# Patient Record
Sex: FEMALE | Race: BLACK OR AFRICAN AMERICAN | NOT HISPANIC OR LATINO | Employment: PART TIME | URBAN - METROPOLITAN AREA
[De-identification: names, ages, dates, MRNs, and addresses within clinical notes are randomized per-mention and may not be internally consistent; named-entity substitution may affect disease eponyms.]

---
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
## 2021-09-24 ENCOUNTER — HOSPITAL ENCOUNTER (EMERGENCY)
Facility: HOSPITAL | Age: 21
Discharge: HOME | End: 2021-09-24
Attending: EMERGENCY MEDICINE
Payer: MEDICAID

## 2021-09-24 VITALS
BODY MASS INDEX: 35.09 KG/M2 | SYSTOLIC BLOOD PRESSURE: 134 MMHG | WEIGHT: 190.69 LBS | OXYGEN SATURATION: 99 % | HEART RATE: 97 BPM | RESPIRATION RATE: 18 BRPM | HEIGHT: 62 IN | DIASTOLIC BLOOD PRESSURE: 88 MMHG | TEMPERATURE: 99 F

## 2021-09-24 DIAGNOSIS — N72 CERVICITIS: ICD-10-CM

## 2021-09-24 DIAGNOSIS — A59.9 TRICHOMONAS VAGINALIS INFECTION: Primary | ICD-10-CM

## 2021-09-24 LAB
B-HCG UR QL: NEGATIVE
BACTERIA #/AREA URNS HPF: ABNORMAL /HPF
BACTERIA GENITAL QL WET PREP: ABNORMAL
BILIRUB UR QL STRIP: ABNORMAL
CLARITY UR: ABNORMAL
CLUE CELLS VAG QL WET PREP: ABNORMAL
COLOR UR: YELLOW
CTP QC/QA: YES
FILAMENT FUNGI VAG WET PREP-#/AREA: ABNORMAL
GLUCOSE UR QL STRIP: NEGATIVE
HGB UR QL STRIP: ABNORMAL
HYALINE CASTS #/AREA URNS LPF: 0 /LPF
KETONES UR QL STRIP: NEGATIVE
LEUKOCYTE ESTERASE UR QL STRIP: ABNORMAL
MICROSCOPIC COMMENT: ABNORMAL
NITRITE UR QL STRIP: POSITIVE
PH UR STRIP: 6 [PH] (ref 5–8)
PROT UR QL STRIP: ABNORMAL
RBC #/AREA URNS HPF: 4 /HPF (ref 0–4)
SP GR UR STRIP: >=1.03 (ref 1–1.03)
SPECIMEN SOURCE: ABNORMAL
T VAGINALIS GENITAL QL WET PREP: ABNORMAL
URN SPEC COLLECT METH UR: ABNORMAL
UROBILINOGEN UR STRIP-ACNC: 1 EU/DL
WBC #/AREA URNS HPF: 40 /HPF (ref 0–5)
WBC #/AREA VAG WET PREP: ABNORMAL
YEAST GENITAL QL WET PREP: ABNORMAL

## 2021-09-24 PROCEDURE — 87086 URINE CULTURE/COLONY COUNT: CPT | Performed by: PHYSICIAN ASSISTANT

## 2021-09-24 PROCEDURE — 87210 SMEAR WET MOUNT SALINE/INK: CPT | Performed by: PHYSICIAN ASSISTANT

## 2021-09-24 PROCEDURE — 96372 THER/PROPH/DIAG INJ SC/IM: CPT

## 2021-09-24 PROCEDURE — 63600175 PHARM REV CODE 636 W HCPCS: Performed by: NURSE PRACTITIONER

## 2021-09-24 PROCEDURE — 87591 N.GONORRHOEAE DNA AMP PROB: CPT | Performed by: PHYSICIAN ASSISTANT

## 2021-09-24 PROCEDURE — 87491 CHLMYD TRACH DNA AMP PROBE: CPT | Performed by: PHYSICIAN ASSISTANT

## 2021-09-24 PROCEDURE — 99284 EMERGENCY DEPT VISIT MOD MDM: CPT | Mod: 25

## 2021-09-24 PROCEDURE — 81000 URINALYSIS NONAUTO W/SCOPE: CPT | Performed by: PHYSICIAN ASSISTANT

## 2021-09-24 PROCEDURE — 81025 URINE PREGNANCY TEST: CPT | Performed by: PHYSICIAN ASSISTANT

## 2021-09-24 PROCEDURE — 25000003 PHARM REV CODE 250: Performed by: NURSE PRACTITIONER

## 2021-09-24 RX ORDER — CEFTRIAXONE 1 G/1
0.5 INJECTION, POWDER, FOR SOLUTION INTRAMUSCULAR; INTRAVENOUS
Status: COMPLETED | OUTPATIENT
Start: 2021-09-24 | End: 2021-09-24

## 2021-09-24 RX ORDER — METRONIDAZOLE 500 MG/1
500 TABLET ORAL EVERY 12 HOURS
Qty: 14 TABLET | Refills: 0 | Status: SHIPPED | OUTPATIENT
Start: 2021-09-24 | End: 2021-10-01

## 2021-09-24 RX ORDER — DOXYCYCLINE HYCLATE 100 MG
100 TABLET ORAL
Status: COMPLETED | OUTPATIENT
Start: 2021-09-24 | End: 2021-09-24

## 2021-09-24 RX ORDER — DOXYCYCLINE 100 MG/1
100 CAPSULE ORAL 2 TIMES DAILY
Qty: 20 CAPSULE | Refills: 0 | Status: SHIPPED | OUTPATIENT
Start: 2021-09-24 | End: 2021-10-04

## 2021-09-24 RX ADMIN — CEFTRIAXONE SODIUM 0.5 G: 1 INJECTION, POWDER, FOR SOLUTION INTRAMUSCULAR; INTRAVENOUS at 02:09

## 2021-09-24 RX ADMIN — DOXYCYCLINE HYCLATE 100 MG: 100 TABLET, COATED ORAL at 02:09

## 2021-09-26 LAB — BACTERIA UR CULT: NO GROWTH

## 2021-09-27 LAB
C TRACH DNA SPEC QL NAA+PROBE: NOT DETECTED
N GONORRHOEA DNA SPEC QL NAA+PROBE: NOT DETECTED

## 2022-08-05 ENCOUNTER — HOSPITAL ENCOUNTER (EMERGENCY)
Facility: HOSPITAL | Age: 22
Discharge: HOME | End: 2022-08-05
Attending: FAMILY MEDICINE
Payer: MEDICAID

## 2022-08-05 VITALS
WEIGHT: 186.75 LBS | HEART RATE: 94 BPM | RESPIRATION RATE: 18 BRPM | HEIGHT: 62 IN | TEMPERATURE: 98 F | SYSTOLIC BLOOD PRESSURE: 148 MMHG | OXYGEN SATURATION: 100 % | BODY MASS INDEX: 34.37 KG/M2 | DIASTOLIC BLOOD PRESSURE: 94 MMHG

## 2022-08-05 DIAGNOSIS — Z20.2 POSSIBLE EXPOSURE TO STD: Primary | ICD-10-CM

## 2022-08-05 DIAGNOSIS — N76.0 ACUTE VAGINITIS: ICD-10-CM

## 2022-08-05 LAB
APPEARANCE UR: CLEAR
B-HCG UR QL: NEGATIVE
BACTERIA #/AREA URNS AUTO: ABNORMAL /HPF
BILIRUB UR QL STRIP.AUTO: NEGATIVE MG/DL
C TRACH DNA SPEC QL NAA+PROBE: NOT DETECTED
CLUE CELLS VAG QL WET PREP: ABNORMAL
COLOR UR AUTO: ABNORMAL
CTP QC/QA: YES
GLUCOSE UR QL STRIP.AUTO: NORMAL MG/DL
HYALINE CASTS #/AREA URNS LPF: ABNORMAL /LPF
KETONES UR QL STRIP.AUTO: NEGATIVE MG/DL
LEUKOCYTE ESTERASE UR QL STRIP.AUTO: NEGATIVE UNIT/L
MUCOUS THREADS URNS QL MICRO: ABNORMAL /LPF
N GONORRHOEA DNA SPEC QL NAA+PROBE: NOT DETECTED
NITRITE UR QL STRIP.AUTO: NEGATIVE
PH UR STRIP.AUTO: 6.5 [PH]
PROT UR QL STRIP.AUTO: NEGATIVE MG/DL
RBC #/AREA URNS AUTO: ABNORMAL /HPF
RBC UR QL AUTO: NEGATIVE UNIT/L
SP GR UR STRIP.AUTO: 1.02
SQUAMOUS #/AREA URNS LPF: ABNORMAL /HPF
T VAGINALIS VAG QL WET PREP: ABNORMAL
UROBILINOGEN UR STRIP-ACNC: NORMAL MG/DL
WBC #/AREA URNS AUTO: ABNORMAL /HPF
WBC #/AREA VAG WET PREP: ABNORMAL
YEAST SPEC QL WET PREP: ABNORMAL

## 2022-08-05 PROCEDURE — 87210 SMEAR WET MOUNT SALINE/INK: CPT | Performed by: PHYSICIAN ASSISTANT

## 2022-08-05 PROCEDURE — 87591 N.GONORRHOEAE DNA AMP PROB: CPT | Performed by: PHYSICIAN ASSISTANT

## 2022-08-05 PROCEDURE — 99283 EMERGENCY DEPT VISIT LOW MDM: CPT

## 2022-08-05 PROCEDURE — 81025 URINE PREGNANCY TEST: CPT | Performed by: PHYSICIAN ASSISTANT

## 2022-08-05 PROCEDURE — 81001 URINALYSIS AUTO W/SCOPE: CPT | Performed by: PHYSICIAN ASSISTANT

## 2022-08-05 PROCEDURE — 87491 CHLMYD TRACH DNA AMP PROBE: CPT | Performed by: PHYSICIAN ASSISTANT

## 2022-08-05 PROCEDURE — 63700000 PHARM REV CODE 250 ALT 637 W/O HCPCS: Performed by: PHYSICIAN ASSISTANT

## 2022-08-05 RX ORDER — FLUCONAZOLE 100 MG/1
200 TABLET ORAL
Status: COMPLETED | OUTPATIENT
Start: 2022-08-05 | End: 2022-08-05

## 2022-08-05 RX ADMIN — FLUCONAZOLE 200 MG: 100 TABLET ORAL at 06:08

## 2022-08-05 NOTE — ED PROVIDER NOTES
Encounter Date: 8/5/2022       History     Chief Complaint   Patient presents with    Exposure to STD    Dysuria     C/o vaginal discharge and dysuria after being exposed to possible std     Patient presents to ED with concerns for STD exposure after she began having dysuria, white vaginal discharge and discomfort in vagina after having sexual intercourse 5 days ago.  She denies fever, chills, pelvic pain, back pain, nausea, vomiting, vaginal lesions.     The history is provided by the patient. No  was used.   Vaginal Discharge  This is a new problem. Pertinent negatives include no chest pain, no abdominal pain, no headaches and no shortness of breath. She has tried nothing for the symptoms.     Review of patient's allergies indicates:  No Known Allergies  Past Medical History:   Diagnosis Date    Depression     Insomnia      History reviewed. No pertinent surgical history.  History reviewed. No pertinent family history.  Social History     Tobacco Use    Smoking status: Never Smoker    Smokeless tobacco: Never Used   Substance Use Topics    Alcohol use: Never    Drug use: Never     Review of Systems   Constitutional: Negative for chills and fever.   Eyes: Negative.    Respiratory: Negative for cough and shortness of breath.    Cardiovascular: Negative for chest pain and palpitations.   Gastrointestinal: Negative for abdominal pain, nausea and vomiting.   Genitourinary: Positive for dysuria and vaginal discharge (clear/ white). Negative for genital sores, hematuria and vaginal bleeding.   Musculoskeletal: Negative.    Skin: Negative.    Neurological: Negative for dizziness, light-headedness and headaches.   Hematological: Negative.        Physical Exam     Initial Vitals [08/05/22 1712]   BP Pulse Resp Temp SpO2   (!) 153/110 104 20 97.5 °F (36.4 °C) 100 %      MAP       --         Physical Exam    Nursing note and vitals reviewed.  Constitutional: She appears well-developed and  well-nourished.   HENT:   Nose: Nose normal.   Mouth/Throat: Oropharynx is clear and moist.   Eyes: Conjunctivae are normal.   Neck: Neck supple.   Normal range of motion.  Cardiovascular: Normal rate, normal heart sounds and intact distal pulses.   Pulmonary/Chest: Breath sounds normal.   Abdominal: Abdomen is soft. Bowel sounds are normal.   Genitourinary:    Pelvic exam was performed with patient supine.   There is no tenderness on the right labia. There is no tenderness on the left labia.    Vaginal discharge (clear) present.     Musculoskeletal:      Cervical back: Normal range of motion and neck supple.     Neurological: She is alert.   Skin: Skin is warm.         ED Course   Procedures  Labs Reviewed   WET PREP, GENITAL - Abnormal; Notable for the following components:       Result Value    WBC, Wet Prep Few (*)     All other components within normal limits   URINALYSIS, REFLEX TO URINE CULTURE - Abnormal; Notable for the following components:    Color, UA Light-Yellow (*)     Squamous Epithelial Cells, UA Occ (*)     Mucous, UA Trace (*)     RBC, UA 6-10 (*)     All other components within normal limits   CHLAMYDIA/GONORRHOEAE(GC), PCR - Normal   POCT URINE PREGNANCY          Imaging Results    None          Medications   fluconazole tablet 200 mg (200 mg Oral Given 8/5/22 1807)     Medical Decision Making:   Clinical Tests:   Lab Tests: Ordered and Reviewed  ED Management:  The patient is resting comfortably and in no acute distress.  I personally discussed her test results and treatment plan. She's likely experiencing vaginitis/ irration with mild yeast infection post sexual intercourse.    Gave strict ED precautions.  Specific conditions for return to the emergency department and importance of follow up with her primary care provider and GYN.  Patient voices understanding and agrees to the plan discussed. All patients' questions have been answered at this time.   She has remained hemodynamically stable  throughout entire stay in ED and is stable for discharge home.             ED Course as of 08/08/22 0122   Fri Aug 05, 2022   1740 Preg Test, Ur: Negative [ER]   Mon Aug 08, 2022   0122 Chlamydia trachomatis PCR: Not Detected [ER]   0122 N. gonorrhea PCR: Not Detected [ER]      ED Course User Index  [ER] SCAR Fields             Clinical Impression:   Final diagnoses:  [Z20.2] Possible exposure to STD (Primary)  [N76.0] Acute vaginitis          ED Disposition Condition    Discharge Stable        ED Prescriptions     None        Follow-up Information     Follow up With Specialties Details Why Contact Info    [facility] Emergency Medicine  As needed, If symptoms worsen [address] [facility] [address] [phone]           SCAR Fields  08/08/22 0122

## 2022-08-08 ENCOUNTER — HOSPITAL ENCOUNTER (EMERGENCY)
Facility: HOSPITAL | Age: 22
Discharge: HOME | End: 2022-08-08
Attending: INTERNAL MEDICINE
Payer: MEDICAID

## 2022-08-08 VITALS
HEIGHT: 62 IN | DIASTOLIC BLOOD PRESSURE: 78 MMHG | TEMPERATURE: 98 F | OXYGEN SATURATION: 99 % | WEIGHT: 180 LBS | HEART RATE: 61 BPM | BODY MASS INDEX: 33.13 KG/M2 | RESPIRATION RATE: 18 BRPM | SYSTOLIC BLOOD PRESSURE: 123 MMHG

## 2022-08-08 DIAGNOSIS — I49.9 CARDIAC ARRHYTHMIA, UNSPECIFIED CARDIAC ARRHYTHMIA TYPE: Primary | ICD-10-CM

## 2022-08-08 DIAGNOSIS — R07.9 CHEST PAIN: ICD-10-CM

## 2022-08-08 LAB
ALBUMIN SERPL-MCNC: 4.4 GM/DL (ref 3.5–5)
ALBUMIN/GLOB SERPL: 1.2 RATIO (ref 1.1–2)
ALP SERPL-CCNC: 48 UNIT/L (ref 40–150)
ALT SERPL-CCNC: 25 UNIT/L (ref 0–55)
AST SERPL-CCNC: 22 UNIT/L (ref 5–34)
B-HCG UR QL: NEGATIVE
BASOPHILS # BLD AUTO: 0.03 X10(3)/MCL (ref 0–0.2)
BASOPHILS NFR BLD AUTO: 0.3 %
BILIRUBIN DIRECT+TOT PNL SERPL-MCNC: 0.4 MG/DL
BUN SERPL-MCNC: 9.2 MG/DL (ref 7–18.7)
CALCIUM SERPL-MCNC: 10 MG/DL (ref 8.4–10.2)
CHLORIDE SERPL-SCNC: 105 MMOL/L (ref 98–107)
CK MB SERPL-MCNC: 0.7 NG/ML
CK SERPL-CCNC: 87 U/L (ref 29–168)
CO2 SERPL-SCNC: 24 MMOL/L (ref 22–29)
CREAT SERPL-MCNC: 0.69 MG/DL (ref 0.55–1.02)
CTP QC/QA: YES
D DIMER PPP IA.FEU-MCNC: <0.27 UG/ML FEU (ref 0–0.5)
EOSINOPHIL # BLD AUTO: 0.08 X10(3)/MCL (ref 0–0.9)
EOSINOPHIL NFR BLD AUTO: 0.8 %
ERYTHROCYTE [DISTWIDTH] IN BLOOD BY AUTOMATED COUNT: 13.3 % (ref 11.5–17)
GFR SERPLBLD CREATININE-BSD FMLA CKD-EPI: >60 MLS/MIN/1.73/M2
GLOBULIN SER-MCNC: 3.7 GM/DL (ref 2.4–3.5)
GLUCOSE SERPL-MCNC: 101 MG/DL (ref 74–100)
HCT VFR BLD AUTO: 41 % (ref 37–47)
HGB BLD-MCNC: 14 GM/DL (ref 12–16)
IMM GRANULOCYTES # BLD AUTO: 0.02 X10(3)/MCL (ref 0–0.04)
IMM GRANULOCYTES NFR BLD AUTO: 0.2 %
LYMPHOCYTES # BLD AUTO: 3.43 X10(3)/MCL (ref 0.6–4.6)
LYMPHOCYTES NFR BLD AUTO: 36 %
MAGNESIUM SERPL-MCNC: 2 MG/DL (ref 1.6–2.6)
MCH RBC QN AUTO: 30.4 PG (ref 27–31)
MCHC RBC AUTO-ENTMCNC: 34.1 MG/DL (ref 33–36)
MCV RBC AUTO: 88.9 FL (ref 80–94)
MONOCYTES # BLD AUTO: 0.7 X10(3)/MCL (ref 0.1–1.3)
MONOCYTES NFR BLD AUTO: 7.3 %
NEUTROPHILS # BLD AUTO: 5.3 X10(3)/MCL (ref 2.1–9.2)
NEUTROPHILS NFR BLD AUTO: 55.4 %
NRBC BLD AUTO-RTO: 0 %
PLATELET # BLD AUTO: 232 X10(3)/MCL (ref 130–400)
PMV BLD AUTO: 11.2 FL (ref 7.4–10.4)
POTASSIUM SERPL-SCNC: 3.8 MMOL/L (ref 3.5–5.1)
PROT SERPL-MCNC: 8.1 GM/DL (ref 6.4–8.3)
RBC # BLD AUTO: 4.61 X10(6)/MCL (ref 4.2–5.4)
SODIUM SERPL-SCNC: 141 MMOL/L (ref 136–145)
T4 FREE SERPL-MCNC: 0.98 NG/DL (ref 0.7–1.48)
TROPONIN I SERPL-MCNC: <0.01 NG/ML (ref 0–0.04)
TSH SERPL-ACNC: 0.92 UIU/ML (ref 0.35–4.94)
WBC # SPEC AUTO: 9.5 X10(3)/MCL (ref 4.5–11.5)

## 2022-08-08 PROCEDURE — 99285 EMERGENCY DEPT VISIT HI MDM: CPT | Mod: 25

## 2022-08-08 PROCEDURE — 25000003 PHARM REV CODE 250: Performed by: PHYSICIAN ASSISTANT

## 2022-08-08 PROCEDURE — 84484 ASSAY OF TROPONIN QUANT: CPT | Performed by: PHYSICIAN ASSISTANT

## 2022-08-08 PROCEDURE — 93005 ELECTROCARDIOGRAM TRACING: CPT

## 2022-08-08 PROCEDURE — 84439 ASSAY OF FREE THYROXINE: CPT | Performed by: PHYSICIAN ASSISTANT

## 2022-08-08 PROCEDURE — 80053 COMPREHEN METABOLIC PANEL: CPT | Performed by: PHYSICIAN ASSISTANT

## 2022-08-08 PROCEDURE — 84443 ASSAY THYROID STIM HORMONE: CPT | Performed by: PHYSICIAN ASSISTANT

## 2022-08-08 PROCEDURE — 82550 ASSAY OF CK (CPK): CPT | Performed by: PHYSICIAN ASSISTANT

## 2022-08-08 PROCEDURE — 82553 CREATINE MB FRACTION: CPT | Performed by: PHYSICIAN ASSISTANT

## 2022-08-08 PROCEDURE — 81025 URINE PREGNANCY TEST: CPT | Performed by: PHYSICIAN ASSISTANT

## 2022-08-08 PROCEDURE — 85379 FIBRIN DEGRADATION QUANT: CPT | Performed by: PHYSICIAN ASSISTANT

## 2022-08-08 PROCEDURE — 83735 ASSAY OF MAGNESIUM: CPT | Performed by: PHYSICIAN ASSISTANT

## 2022-08-08 PROCEDURE — 85025 COMPLETE CBC W/AUTO DIFF WBC: CPT | Performed by: PHYSICIAN ASSISTANT

## 2022-08-08 PROCEDURE — 36415 COLL VENOUS BLD VENIPUNCTURE: CPT | Performed by: PHYSICIAN ASSISTANT

## 2022-08-08 RX ORDER — HYDROXYZINE PAMOATE 25 MG/1
25 CAPSULE ORAL
Status: COMPLETED | OUTPATIENT
Start: 2022-08-08 | End: 2022-08-08

## 2022-08-08 RX ADMIN — HYDROXYZINE PAMOATE 25 MG: 25 CAPSULE ORAL at 08:08

## 2022-08-08 NOTE — Clinical Note
"Shirin Rodrigueze" Chong was seen and treated in our emergency department on 8/8/2022.  She may return to work on 08/10/2022.       If you have any questions or concerns, please don't hesitate to call.      SCAR Fields"

## 2022-08-09 NOTE — ED PROVIDER NOTES
Encounter Date: 8/8/2022       History     Chief Complaint   Patient presents with    Chest Pain     Pt brought in via [facility] for chest discomfort that started earlier at work. VSS.     Patient is a 22 year old female with a hx of depression and insomnia who presents to the ED with chest discomfort that started while at work earlier today.   She states this pain has been intermittent since then.  She does admit to drinking tea 2x daily.  She denies SOB, abdominal pain, nausea, vomiting, dizziness, vision changes.    The history is provided by the patient. No  was used.     Review of patient's allergies indicates:  No Known Allergies  Past Medical History:   Diagnosis Date    Depression     Insomnia      History reviewed. No pertinent surgical history.  History reviewed. No pertinent family history.  Social History     Tobacco Use    Smoking status: Never Smoker    Smokeless tobacco: Never Used   Substance Use Topics    Alcohol use: Never    Drug use: Never     Review of Systems   Constitutional: Negative for chills and fever.   Eyes: Negative.    Respiratory: Positive for chest tightness. Negative for cough and shortness of breath.    Cardiovascular: Positive for chest pain and palpitations.   Gastrointestinal: Negative for abdominal pain, nausea and vomiting.   Genitourinary: Negative.    Musculoskeletal: Negative.    Skin: Negative.    Neurological: Negative for dizziness, light-headedness and headaches.   Hematological: Negative.        Physical Exam     Initial Vitals [08/08/22 1836]   BP Pulse Resp Temp SpO2   120/72 82 18 98.4 °F (36.9 °C) 99 %      MAP       --         Physical Exam    Nursing note and vitals reviewed.  Constitutional: She appears well-developed and well-nourished.   HENT:   Nose: Nose normal.   Mouth/Throat: Oropharynx is clear and moist.   Eyes: Conjunctivae and EOM are normal. Pupils are equal, round, and reactive to light.   Cardiovascular: Normal heart sounds and  intact distal pulses.   HR fluctuating from 80s to 140   Pulmonary/Chest: Breath sounds normal. She has no wheezes.   Abdominal: Abdomen is soft. Bowel sounds are normal.   Musculoskeletal:         General: Normal range of motion.     Neurological: She is alert and oriented to person, place, and time. She has normal strength. GCS score is 15. GCS eye subscore is 4. GCS verbal subscore is 5. GCS motor subscore is 6.   Skin: Skin is warm. Capillary refill takes less than 2 seconds.         ED Course   Procedures  Labs Reviewed   COMPREHENSIVE METABOLIC PANEL - Abnormal; Notable for the following components:       Result Value    Glucose Level 101 (*)     Globulin 3.7 (*)     All other components within normal limits   CBC WITH DIFFERENTIAL - Abnormal; Notable for the following components:    MPV 11.2 (*)     All other components within normal limits   TROPONIN I - Normal   D DIMER, QUANTITATIVE - Normal   TSH - Normal   T4, FREE - Normal   CK-MB - Normal   CK - Normal   MAGNESIUM - Normal   CBC W/ AUTO DIFFERENTIAL    Narrative:     The following orders were created for panel order CBC Auto Differential.  Procedure                               Abnormality         Status                     ---------                               -----------         ------                     CBC with Differential[[id number]]        Abnormal            Final result                 Please view results for these tests on the individual orders.   EXTRA TUBES    Narrative:     The following orders were created for panel order EXTRA TUBES.  Procedure                               Abnormality         Status                     ---------                               -----------         ------                     Light Blue Top Hold[[id number]]                              In process                   Please view results for these tests on the individual orders.   LIGHT BLUE TOP HOLD   POCT URINE PREGNANCY     EKG Readings: (Independently  Interpreted)   Heart Rate: 89.   undetermined rhythm    Other EKG Interpretations: 20:51  Unusual P axis short AR, probable junctional tachycardia with  PACs and abberant conduction, rate 104     ECG Results          EKG 12-lead (Final result)  Result time 08/09/22 17:21:37    Final result by Interface, Lab In [facility] (08/09/22 17:21:37)                 Narrative:    Test Reason : R07.9,    Vent. Rate : 078 BPM     Atrial Rate : 078 BPM     P-R Int : 162 ms          QRS Dur : 086 ms      QT Int : 376 ms       P-R-T Axes : 048 040 049 degrees     QTc Int : 428 ms    Normal sinus rhythm  Nonspecific T wave abnormality  Abnormal ECG  Confirmed by Archie Ware MD ([id number]) on 8/9/2022 5:21:29 PM    Referred By: CHRISTY   SELF           Confirmed By:Archie Ware MD                            Imaging Results          X-Ray Chest PA And Lateral (Final result)  Result time 08/09/22 06:41:54    Final result by Hamilton Maldonado MD (08/09/22 06:41:54)                 Impression:      No acute cardiopulmonary process.      Electronically signed by: Hamilton Maldonado  Date:    08/09/2022  Time:    06:41             Narrative:    EXAMINATION:  XR CHEST PA AND LATERAL    CLINICAL HISTORY:  Chest pain, unspecified    TECHNIQUE:  Two views of the chest    COMPARISON:  08/12/2021    FINDINGS:  No focal opacification, pleural effusion, or pneumothorax.    The cardiomediastinal silhouette is within normal limits.    No acute osseous abnormality.                    ED Interpretation by SCAR Fields (08/08/22 22:02:16, [facility], Emergency Medicine)    No acute cardiopulmonary process identified                               Medications   hydrOXYzine pamoate capsule 25 mg (25 mg Oral Given 8/8/22 2049)     Medical Decision Making:   Clinical Tests:   Lab Tests: Ordered and Reviewed  Radiological Study: Ordered and Reviewed  Medical Tests: Ordered and Reviewed  ED Management:  The patient is resting  comfortably and in no acute distress.  She states that her symptoms have improved after treatment in Emergency Department. I personally discussed her test results and treatment plan.  Gave strict ED precautions. Patient has transient irregular heart rhythm, unknown reason.  I ordered outpatient Holter monitor and echo with cardiology follow up.  I advised her to stop drinking caffeine, cade tea.   She states she feels much better after taking Vistaril.  I did not see irregular rhythm after this medication was given.    Specific conditions for return to the emergency department and importance of follow up with her primary care provider or the physician listed on the discharge instructions.  Patient voices understanding and agrees to the plan discussed. All patients' questions have been answered at this time.   She has remained hemodynamically stable throughout entire stay in ED and is stable for discharge home.  Other:   I have discussed this case with another health care provider.       <> Summary of the Discussion: I discussed this patient with  Dr. Bunch who evaluated labs and EKGs and plan for follow up and further work up.               ED Course as of 08/10/22 0153   Mon Aug 08, 2022   2159 D-Dimer: <0.27 [ER]   2200 Free T4: 0.98 [ER]   2200 Thyroid Stimulating Hormone: 0.9156 [ER]   2200 Troponin I: <0.010 [ER]   2252 Magnesium: 2.00 [ER]   2300 Patient states she feels significantly better after Vistaril given in ED.  She is now calm and HR consistently in 80s [ER]   Wed Aug 10, 2022   0153 X-Ray Chest PA And Lateral  No acute cardiopulmonary process. [ER]      ED Course User Index  [ER] SCAR Fields             Clinical Impression:   Final diagnoses:  [R07.9] Chest pain  [I49.9] Cardiac arrhythmia, unspecified cardiac arrhythmia type (Primary)          ED Disposition Condition    Discharge Stable        ED Prescriptions     None        Follow-up Information     Follow up With Specialties Details Why  Contact Info    [facility] Emergency Medicine  As needed, If symptoms worsen [address] [facility] [address] [phone]    [facility] Emergency Medicine  As needed, If symptoms worsen [address] [facility] [address] [phone]    [facility] Cardiology  They will call you to schedule apt [address] [facility] [address] [phone]           Rya Alarcon MD  08/09/22 0820       SCAR Fields  08/10/22 0153     temporal

## 2022-08-09 NOTE — DISCHARGE INSTRUCTIONS
Do not drink caffeine until all testing is done and you are seen by cardiology.  Get their recommendations on caffeine safety.  Stop drinking Tea for now.   48 hour Holter Monitor and Cardiac Echo ordered for further workup and heart evaluation.   Cardiology referral sent and they will call you to schedule an apt.  Follow up with PCP within 3-5 days.

## 2024-09-13 ENCOUNTER — HOSPITAL ENCOUNTER (EMERGENCY)
Facility: HOSPITAL | Age: 24
Discharge: HOME | End: 2024-09-13
Attending: EMERGENCY MEDICINE

## 2024-09-13 VITALS
BODY MASS INDEX: 35.78 KG/M2 | RESPIRATION RATE: 18 BRPM | HEART RATE: 77 BPM | SYSTOLIC BLOOD PRESSURE: 130 MMHG | WEIGHT: 194.44 LBS | DIASTOLIC BLOOD PRESSURE: 89 MMHG | HEIGHT: 62 IN | TEMPERATURE: 97 F | OXYGEN SATURATION: 99 %

## 2024-09-13 DIAGNOSIS — N92.1 MENORRHAGIA WITH IRREGULAR CYCLE: Primary | ICD-10-CM

## 2024-09-13 LAB
ALBUMIN SERPL-MCNC: 3.9 G/DL (ref 3.5–5)
ALBUMIN/GLOB SERPL: 1.1 RATIO (ref 1.1–2)
ALP SERPL-CCNC: 43 UNIT/L (ref 40–150)
ALT SERPL-CCNC: 25 UNIT/L (ref 0–55)
ANION GAP SERPL CALC-SCNC: 9 MEQ/L
AST SERPL-CCNC: 20 UNIT/L (ref 5–34)
B-HCG UR QL: NEGATIVE
BACTERIA #/AREA URNS AUTO: ABNORMAL /HPF
BASOPHILS # BLD AUTO: 0.03 X10(3)/MCL
BASOPHILS NFR BLD AUTO: 0.4 %
BILIRUB SERPL-MCNC: 0.4 MG/DL
BILIRUB UR QL STRIP.AUTO: NEGATIVE
BUN SERPL-MCNC: 7.7 MG/DL (ref 7–18.7)
CALCIUM SERPL-MCNC: 9.5 MG/DL (ref 8.4–10.2)
CAOX CRY UR QL COMP ASSIST: ABNORMAL
CHLORIDE SERPL-SCNC: 109 MMOL/L (ref 98–107)
CLARITY UR: ABNORMAL
CO2 SERPL-SCNC: 23 MMOL/L (ref 22–29)
COLOR UR AUTO: YELLOW
CREAT SERPL-MCNC: 0.68 MG/DL (ref 0.55–1.02)
CREAT/UREA NIT SERPL: 11
CTP QC/QA: YES
EOSINOPHIL # BLD AUTO: 0.12 X10(3)/MCL (ref 0–0.9)
EOSINOPHIL NFR BLD AUTO: 1.6 %
ERYTHROCYTE [DISTWIDTH] IN BLOOD BY AUTOMATED COUNT: 13.2 % (ref 11.5–17)
GFR SERPLBLD CREATININE-BSD FMLA CKD-EPI: >60 ML/MIN/1.73/M2
GLOBULIN SER-MCNC: 3.7 GM/DL (ref 2.4–3.5)
GLUCOSE SERPL-MCNC: 111 MG/DL (ref 74–100)
GLUCOSE UR QL STRIP: NORMAL
HCT VFR BLD AUTO: 36.5 % (ref 37–47)
HGB BLD-MCNC: 12.5 G/DL (ref 12–16)
HGB UR QL STRIP: ABNORMAL
IMM GRANULOCYTES # BLD AUTO: 0.02 X10(3)/MCL (ref 0–0.04)
IMM GRANULOCYTES NFR BLD AUTO: 0.3 %
KETONES UR QL STRIP: ABNORMAL
LEUKOCYTE ESTERASE UR QL STRIP: 25
LYMPHOCYTES # BLD AUTO: 3.04 X10(3)/MCL (ref 0.6–4.6)
LYMPHOCYTES NFR BLD AUTO: 39.3 %
MCH RBC QN AUTO: 30.6 PG (ref 27–31)
MCHC RBC AUTO-ENTMCNC: 34.2 G/DL (ref 33–36)
MCV RBC AUTO: 89.5 FL (ref 80–94)
MONOCYTES # BLD AUTO: 0.58 X10(3)/MCL (ref 0.1–1.3)
MONOCYTES NFR BLD AUTO: 7.5 %
MUCOUS THREADS URNS QL MICRO: ABNORMAL /LPF
NEUTROPHILS # BLD AUTO: 3.95 X10(3)/MCL (ref 2.1–9.2)
NEUTROPHILS NFR BLD AUTO: 50.9 %
NITRITE UR QL STRIP: NEGATIVE
NRBC BLD AUTO-RTO: 0 %
PH UR STRIP: 5.5 [PH]
PLATELET # BLD AUTO: 258 X10(3)/MCL (ref 130–400)
PMV BLD AUTO: 10.3 FL (ref 7.4–10.4)
POTASSIUM SERPL-SCNC: 3.5 MMOL/L (ref 3.5–5.1)
PROT SERPL-MCNC: 7.6 GM/DL (ref 6.4–8.3)
PROT UR QL STRIP: ABNORMAL
RBC # BLD AUTO: 4.08 X10(6)/MCL (ref 4.2–5.4)
RBC #/AREA URNS AUTO: ABNORMAL /HPF
SODIUM SERPL-SCNC: 141 MMOL/L (ref 136–145)
SP GR UR STRIP.AUTO: 1.03 (ref 1–1.03)
SQUAMOUS #/AREA URNS LPF: ABNORMAL /HPF
UROBILINOGEN UR STRIP-ACNC: ABNORMAL
WBC # BLD AUTO: 7.74 X10(3)/MCL (ref 4.5–11.5)
WBC #/AREA URNS AUTO: ABNORMAL /HPF

## 2024-09-13 PROCEDURE — 81025 URINE PREGNANCY TEST: CPT | Performed by: PHYSICIAN ASSISTANT

## 2024-09-13 PROCEDURE — 81001 URINALYSIS AUTO W/SCOPE: CPT | Performed by: PHYSICIAN ASSISTANT

## 2024-09-13 PROCEDURE — 85025 COMPLETE CBC W/AUTO DIFF WBC: CPT | Performed by: PHYSICIAN ASSISTANT

## 2024-09-13 PROCEDURE — 99284 EMERGENCY DEPT VISIT MOD MDM: CPT | Mod: 25

## 2024-09-13 PROCEDURE — 80053 COMPREHEN METABOLIC PANEL: CPT | Performed by: PHYSICIAN ASSISTANT

## 2024-09-13 RX ORDER — MEDROXYPROGESTERONE ACETATE 10 MG/1
10 TABLET ORAL DAILY
Qty: 10 TABLET | Refills: 0 | Status: SHIPPED | OUTPATIENT
Start: 2024-09-13 | End: 2024-09-23

## 2024-09-13 NOTE — ED PROVIDER NOTES
Encounter Date: 9/13/2024       History     Chief Complaint   Patient presents with    Vaginal Bleeding     Pt reports vaginal bleeding since MAY! HEAVER W LG CLOTS SINCE YESTERDAY. MILD LOWER ABD CRAMPS AND NAUSEA.  REPORTS DIZZINESS.  NO SOB.  VSS.     Shirin Schwarz is a 24 y.o. female with a history of insomnia and depression who presents to the ED for evaluation of vaginal bleeding. Pt reports history of PCOS and irregular periods. States she has been lightly bleeding intermittently since May. Does not have insurance so has not tried to establish care with a gynecologist. Yesterday she started bleeding a lot heavier than usual and passing clots. Has continued passing clots today. Reports abdominal cramping, dizziness, and nausea. Denies syncope, lightheadedness, SOB. Not on birth control.     The history is provided by the patient.     Review of patient's allergies indicates:  No Known Allergies  Past Medical History:   Diagnosis Date    Depression     Insomnia      History reviewed. No pertinent surgical history.  No family history on file.  Social History     Tobacco Use    Smoking status: Never    Smokeless tobacco: Never   Substance Use Topics    Alcohol use: Never    Drug use: Not Currently     Types: Marijuana     Review of Systems   Constitutional:  Negative for fever.   HENT:  Negative for sore throat.    Respiratory:  Negative for shortness of breath.    Cardiovascular:  Negative for chest pain.   Gastrointestinal:  Negative for nausea.   Genitourinary:  Positive for vaginal bleeding. Negative for dysuria.   Musculoskeletal:  Negative for back pain.   Skin:  Negative for rash.   Neurological:  Negative for weakness.   Hematological:  Does not bruise/bleed easily.       Physical Exam     Initial Vitals [09/13/24 0713]   BP Pulse Resp Temp SpO2   136/86 92 18 97.2 °F (36.2 °C) 96 %      MAP       --         Physical Exam    Nursing note and vitals reviewed.  Constitutional: She appears well-developed  and well-nourished. No distress.   HENT:   Head: Normocephalic and atraumatic.   Mouth/Throat: No oropharyngeal exudate.   Eyes: EOM are normal. No scleral icterus.   Neck: Neck supple.   Normal range of motion.  Cardiovascular:  Normal rate and regular rhythm.           No murmur heard.  Pulmonary/Chest: Breath sounds normal. No respiratory distress. She has no wheezes.   Abdominal: Abdomen is soft. Bowel sounds are normal. She exhibits no distension. There is no abdominal tenderness. There is no rebound and no guarding.   Musculoskeletal:         General: No tenderness. Normal range of motion.      Cervical back: Normal range of motion and neck supple.     Neurological: She is alert and oriented to person, place, and time. No cranial nerve deficit.   Skin: Skin is warm and dry. Capillary refill takes less than 2 seconds. No erythema.   Psychiatric: She has a normal mood and affect. Her behavior is normal. Judgment and thought content normal.         ED Course   Procedures  Labs Reviewed   COMPREHENSIVE METABOLIC PANEL - Abnormal       Result Value    Sodium 141      Potassium 3.5      Chloride 109 (*)     CO2 23      Glucose 111 (*)     Blood Urea Nitrogen 7.7      Creatinine 0.68      Calcium 9.5      Protein Total 7.6      Albumin 3.9      Globulin 3.7 (*)     Albumin/Globulin Ratio 1.1      Bilirubin Total 0.4      ALP 43      ALT 25      AST 20      eGFR >60      Anion Gap 9.0      BUN/Creatinine Ratio 11     URINALYSIS, REFLEX TO URINE CULTURE - Abnormal    Color, UA Yellow      Appearance, UA Turbid (*)     Specific Gravity, UA 1.035 (*)     pH, UA 5.5      Protein, UA 1+ (*)     Glucose, UA Normal      Ketones, UA Trace (*)     Blood, UA 3+ (*)     Bilirubin, UA Negative      Urobilinogen, UA 1+ (*)     Nitrites, UA Negative      Leukocyte Esterase, UA 25 (*)     RBC, UA 51-99 (*)     WBC, UA 3-5      Bacteria, UA None Seen      Squamous Epithelial Cells, UA Occasional      Mucous, UA Small (*)     Calcium  Oxalate Crystals, UA Few (*)    CBC WITH DIFFERENTIAL - Abnormal    WBC 7.74      RBC 4.08 (*)     Hgb 12.5      Hct 36.5 (*)     MCV 89.5      MCH 30.6      MCHC 34.2      RDW 13.2      Platelet 258      MPV 10.3      Neut % 50.9      Lymph % 39.3      Mono % 7.5      Eos % 1.6      Basophil % 0.4      Lymph # 3.04      Neut # 3.95      Mono # 0.58      Eos # 0.12      Baso # 0.03      IG# 0.02      IG% 0.3      NRBC% 0.0     CBC W/ AUTO DIFFERENTIAL    Narrative:     The following orders were created for panel order CBC auto differential.  Procedure                               Abnormality         Status                     ---------                               -----------         ------                     CBC with Differential[[id number]]       Abnormal            Final result                 Please view results for these tests on the individual orders.   POCT URINE PREGNANCY    POC Preg Test, Ur Negative       Acceptable Yes            Imaging Results              US Pelvis Comp with Transvag NON-OB (xpd (Final result)  Result time 09/13/24 10:06:51      Final result by Kaylie Cavazos MD (09/13/24 10:06:51)                   Impression:      Cystic change at the mildly thickened endometrium.  Correlate for cystic hyperplasia    Multiple small peripheral ovarian follicles bilaterally.  Appearance is nonspecific but can be seen with PCOS.  Correlate with clinical and laboratory data.      Electronically signed by: Kaylie Cavazos  Date:    09/13/2024  Time:    10:06               Narrative:    EXAMINATION:  US PELVIS COMP WITH TRANSVAG NON-OB (XPD)    CLINICAL HISTORY:  menorrhagia;    TECHNIQUE:  Transabdominal sonography of the pelvis was performed, followed by transvaginal sonography to better evaluate the uterus and ovaries.    COMPARISON:  None.    FINDINGS:  UTERUS/CERVIX:    Retroverted uterus.    Size: 5.6 x 3.7 x 3 cm    Masses: None    Endometrium: 11 mm.  Cystic change at the  endometrium.    RIGHT OVARY:    Size: 2.8 x 2.6 x 2.4 cm    Appearance: Multiple small peripheral follicles.    Vascular flow: Normal spectral waveforms.    LEFT OVARY:    Size: 3.5 x 2 x 2 cm    Appearance: Multiple small peripheral follicles.    Vascular Flow: Normal spectral waveforms.    FREE FLUID:    None                                       Medications - No data to display  Medical Decision Making  H/H stable at baseline. Pt HDS and afebrile. Abdomen soft, non-tender. Stable for discharge with provera 10 mg daily x 10 days. Referral to gynecology placed and instructed patient to follow up asap. ED return precautions given. She verbalized understanding. All questions answered.     Amount and/or Complexity of Data Reviewed  Labs: ordered. Decision-making details documented in ED Course.  Radiology: ordered. Decision-making details documented in ED Course.    Risk  Prescription drug management.               ED Course as of 09/13/24 1027   Fri Sep 13, 2024   0759 Hemoglobin: 12.5 [KD]   0800 Hematocrit(!): 36.5 [KD]   1010 US Pelvis Comp with Transvag NON-OB (xpd  Cystic change at the mildly thickened endometrium.  Correlate for cystic hyperplasia     Multiple small peripheral ovarian follicles bilaterally.  Appearance is nonspecific but can be seen with PCOS.  Correlate with clinical and laboratory data. [KD]      ED Course User Index  [KD] Lilly Castellanos PA-C                           Clinical Impression:  Final diagnoses:  [N92.1] Menorrhagia with irregular cycle (Primary)          ED Disposition Condition    Discharge Stable          ED Prescriptions       Medication Sig Dispense Start Date End Date Auth. Provider    medroxyPROGESTERone (PROVERA) 10 MG tablet Take 1 tablet (10 mg total) by mouth once daily. for 10 days 10 tablet 9/13/2024 9/23/2024 Lilly Castellanos PA-C          Follow-up Information       Follow up With Specialties Details Why Contact Info    [facility] - Emergency Dept  Emergency Medicine  If symptoms worsen [address] [facility] [id number] [phone]    [facility] - GYN Gynecology Schedule an appointment as soon as possible for a visit   [facility] [facility] [id number] [phone]             Lilly Castellanos PA-C  09/13/24 1028